# Patient Record
(demographics unavailable — no encounter records)

---

## 2025-02-25 NOTE — CONSULT LETTER
[Dear  ___] : Dear  [unfilled], [Consult Letter:] : I had the pleasure of evaluating your patient, [unfilled]. [Please see my note below.] : Please see my note below. [Consult Closing:] : Thank you very much for allowing me to participate in the care of this patient.  If you have any questions, please do not hesitate to contact me. [Sincerely,] : Sincerely, [FreeTextEntry3] : YARI Wolf Certified Pediatric Nurse Practitioner  Pediatric Neurology  [facility]

## 2025-02-25 NOTE — ASSESSMENT
[FreeTextEntry1] : TYLER is a 6-year-old with speech delays, presented with complaints of frequent frontal headaches starting in December 2024, while in [place]. Since returning, he has experienced intermittent episodes warranting an ED visit. CT scan done which suggested a sinus infection, for which antibiotics were prescribed. The headaches continued intermittently, responding to Tylenol and rest. The symptoms are consistent with migraines. Non focal exam. Recommendations included dietary changes, hydration, and adequate sleep, as well as identifying potential triggers. Preventative and abortive options for migraines were also discussed.

## 2025-02-25 NOTE — PLAN
[FreeTextEntry1] : [] MRI brain is indicated to exclude an underlying structural lesion. [] Attention to hydration, avoidance of fasting and sleep hygiene. [] Start magnesium 200mg nightly and Vitamin B2 (Riboflavin) 200mg nightly [] Motrin PRN  [] Headache diary to keep track of headache frequency. [] f/u 3 months. Call sooner if worsening of s/s.    Lifestyle Goals: Regular sleep/waking times (on both weekdays and weekends) - Children 3-6yo:10-13 hrs; 6-11yo: 9-12 hrs; teens 13+: 8-10 hrs Regular exercise - 30 mins a day, 5 days a week Regular meals (protein rich breakfast within 30 min of waking and no skipping meals) Stay hydrated (1 ounce/kg body weight, 8-10 cups of water per day for teens) Can refer to www.headachereliefguide.com for more information on healthy habits. [id number]

## 2025-02-25 NOTE — HISTORY OF PRESENT ILLNESS
[FreeTextEntry1] : TYLER is a 6 year male here for initial evaluation of headaches.   Headaches began in December 2024 while the patient was in [place] (where they visited from August to December 2024). The headaches have persisted intermittently since their return. Also seen in ED x 12/2024. Labs, CT scan, and X-rays were performed, leading to a diagnosis of sinus infection. A two-week antibiotic course initiated which resulted in some improvement, but headaches recurred. Then seen by PMD who prescribed another week of antibiotics.  Additionally, the mother also reporting hair loss. Became more frequent: On/off x December.  Location: frontal Description: "super hurt"  Time of day/duration: during school, all day.  frequency: daily.  Abortive tx: Tylenol + relief. Rest.  Aura: No  positional component? Better when lay down.  Alleviating factors: Tylenol and Motrin Associated Symptoms: + dizziness, nasuea. + light and noise sensistivity (when severe)  + motion sickness.  Ophthalmology last visit: 01/2025. Glasses recommended.  Education: 1st grade, regular setting hx: delayed speech (started talking at 3yo) Has IEP in place. Rec's ST 2x/week.     Lifestyle Sleep: Bedtime 9-930. Wakes 730. When severe, HAs can wake from sleep. Can wake up c/o nausea.  Diet: Varied Diet.  Hydration: 2 [place] spring bottles.  Activities: gym in school.

## 2025-02-25 NOTE — PHYSICAL EXAM
[Well-appearing] : well-appearing [Normocephalic] : normocephalic [No dysmorphic facial features] : no dysmorphic facial features [No ocular abnormalities] : no ocular abnormalities [Neck supple] : neck supple [No deformities] : no deformities [Alert] : alert [Well related, good eye contact] : well related, good eye contact [Conversant] : conversant [Normal speech and language] : normal speech and language [Follows instructions well] : follows instructions well [Full extraocular movements] : full extraocular movements [No nystagmus] : no nystagmus [Normal facial sensation to light touch] : normal facial sensation to light touch [No facial asymmetry or weakness] : no facial asymmetry or weakness [Gross hearing intact] : gross hearing intact [Equal palate elevation] : equal palate elevation [Good shoulder shrug] : good shoulder shrug [Normal tongue movement] : normal tongue movement [Midline tongue, no fasciculations] : midline tongue, no fasciculations [Normal axial and appendicular muscle tone] : normal axial and appendicular muscle tone [Gets up on table without difficulty] : gets up on table without difficulty [No pronator drift] : no pronator drift [Normal finger tapping and fine finger movements] : normal finger tapping and fine finger movements [No abnormal involuntary movements] : no abnormal involuntary movements [5/5 strength in proximal and distal muscles of arms and legs] : 5/5 strength in proximal and distal muscles of arms and legs [Walks and runs well] : walks and runs well [Able to do deep knee bend] : able to do deep knee bend [Able to walk on heels] : able to walk on heels [Able to walk on toes] : able to walk on toes [Knee jerks] : knee jerks [Good walking balance] : good walking balance [Normal gait] : normal gait [Able to tandem well] : able to tandem well [Negative Romberg] : negative Romberg [de-identified] : No respiratory distress noted.

## 2025-02-25 NOTE — END OF VISIT
[Time Spent: ___ minutes] : I have spent [unfilled] minutes of time on the encounter which excludes teaching and separately reported services. [FreeTextEntry3] : I, Dr. Alberts personally performed the evaluation and management (E/M) services for this new patient.? That E/M includes conducting the clinically appropriate initial history &/or exam, assessing all conditions, and establishing the plan of care. Today, my DANISH, Margarita Murlaith, was here to observe my evaluation and management service for this patient & follow plan of care established by me going forward.

## 2025-02-25 NOTE — BIRTH HISTORY
[At ___ Weeks Gestation] : at [unfilled] weeks gestation [place] [Normal Vaginal Route] : by normal vaginal route [Speech Delay w/ Normal Development] : patient has speech delay with normal development [Speech Therapy] : speech therapy

## 2025-03-27 NOTE — CONSULT LETTER
[Dear  ___] : Dear  [unfilled], [Consult Letter:] : I had the pleasure of evaluating your patient, [unfilled]. [Please see my note below.] : Please see my note below. [Consult Closing:] : Thank you very much for allowing me to participate in the care of this patient.  If you have any questions, please do not hesitate to contact me. [Sincerely,] : Sincerely, [FreeTextEntry3] :  Wyatt Shepherd MD PGY4 Staci Lunsford MD, [phone]

## 2025-03-27 NOTE — PHYSICAL EXAM
[Well Developed] : well developed [Well Nourished] : well nourished [Normal] : alert, oriented as age-appropriate, affect appropriate; no weakness, no facial asymmetry, moves all extremities normal gait- child older than 18 months [de-identified] : Normal looking genitalia

## 2025-03-27 NOTE — CONSULT LETTER
[Dear  ___] : Dear  [unfilled], [Consult Letter:] : I had the pleasure of evaluating your patient, [unfilled]. [Please see my note below.] : Please see my note below. [Consult Closing:] : Thank you very much for allowing me to participate in the care of this patient.  If you have any questions, please do not hesitate to contact me. [Sincerely,] : Sincerely, [FreeTextEntry3] :  Wyatt Shepherd MD PGY4 Staci Lunsford MD, [phone]

## 2025-03-27 NOTE — PHYSICAL EXAM
[Well Developed] : well developed [Well Nourished] : well nourished [Normal] : alert, oriented as age-appropriate, affect appropriate; no weakness, no facial asymmetry, moves all extremities normal gait- child older than 18 months [de-identified] : Normal looking genitalia

## 2025-03-27 NOTE — REASON FOR VISIT
[Initial Evaluation] : an initial evaluation of [Family Member] : family member [Patient] : patient [Parents] : parents [Medical Records] : medical records [FreeTextEntry3] : abnormal lab results

## 2025-03-27 NOTE — CONSULT LETTER
[Dear  ___] : Dear  [unfilled], [Consult Letter:] : I had the pleasure of evaluating your patient, [unfilled]. [Please see my note below.] : Please see my note below. [Consult Closing:] : Thank you very much for allowing me to participate in the care of this patient.  If you have any questions, please do not hesitate to contact me. [Sincerely,] : Sincerely, [FreeTextEntry3] :  Wyatt Shepherd MD PGY4 Staci Lunsford MD, [phone]

## 2025-03-27 NOTE — PHYSICAL EXAM
[Well Developed] : well developed [Well Nourished] : well nourished [Normal] : alert, oriented as age-appropriate, affect appropriate; no weakness, no facial asymmetry, moves all extremities normal gait- child older than 18 months [de-identified] : Normal looking genitalia